# Patient Record
Sex: MALE | Race: WHITE | NOT HISPANIC OR LATINO | Employment: UNEMPLOYED | URBAN - NONMETROPOLITAN AREA
[De-identification: names, ages, dates, MRNs, and addresses within clinical notes are randomized per-mention and may not be internally consistent; named-entity substitution may affect disease eponyms.]

---

## 2023-01-16 ENCOUNTER — OFFICE VISIT (OUTPATIENT)
Dept: CARDIOLOGY | Facility: CLINIC | Age: 43
End: 2023-01-16
Payer: COMMERCIAL

## 2023-01-16 VITALS
HEIGHT: 74 IN | BODY MASS INDEX: 33.28 KG/M2 | OXYGEN SATURATION: 97 % | SYSTOLIC BLOOD PRESSURE: 120 MMHG | WEIGHT: 259.3 LBS | HEART RATE: 93 BPM | DIASTOLIC BLOOD PRESSURE: 79 MMHG

## 2023-01-16 DIAGNOSIS — R06.02 SHORTNESS OF BREATH: Primary | ICD-10-CM

## 2023-01-16 DIAGNOSIS — R53.82 CHRONIC FATIGUE: ICD-10-CM

## 2023-01-16 DIAGNOSIS — R00.2 PALPITATIONS: ICD-10-CM

## 2023-01-16 PROCEDURE — 93000 ELECTROCARDIOGRAM COMPLETE: CPT | Performed by: PHYSICIAN ASSISTANT

## 2023-01-16 PROCEDURE — 99204 OFFICE O/P NEW MOD 45 MIN: CPT | Performed by: PHYSICIAN ASSISTANT

## 2023-01-16 RX ORDER — DULOXETIN HYDROCHLORIDE 30 MG/1
CAPSULE, DELAYED RELEASE ORAL
COMMUNITY
Start: 2022-11-22

## 2023-01-16 RX ORDER — TOCILIZUMAB 180 MG/ML
INJECTION, SOLUTION SUBCUTANEOUS
COMMUNITY
Start: 2022-11-01

## 2023-01-16 RX ORDER — LEFLUNOMIDE 20 MG/1
20 TABLET ORAL DAILY
COMMUNITY
Start: 2023-01-02

## 2023-01-16 RX ORDER — CICLESONIDE 160 UG/1
1 AEROSOL, METERED RESPIRATORY (INHALATION) 2 TIMES DAILY
COMMUNITY
Start: 2023-01-11

## 2023-01-16 RX ORDER — ERGOCALCIFEROL 1.25 MG/1
CAPSULE ORAL
COMMUNITY
Start: 2022-10-10

## 2023-01-16 RX ORDER — AMITRIPTYLINE HYDROCHLORIDE 25 MG/1
25 TABLET, FILM COATED ORAL
COMMUNITY
Start: 2023-01-02

## 2023-02-23 ENCOUNTER — HOSPITAL ENCOUNTER (OUTPATIENT)
Dept: GENERAL RADIOLOGY | Facility: HOSPITAL | Age: 43
Discharge: HOME | End: 2023-02-23
Payer: COMMERCIAL

## 2023-02-23 ENCOUNTER — TRANSCRIBE ORDERS (OUTPATIENT)
Dept: LAB | Facility: HOSPITAL | Age: 43
End: 2023-02-23
Payer: COMMERCIAL

## 2023-02-23 DIAGNOSIS — M25.511 RIGHT SHOULDER PAIN, UNSPECIFIED CHRONICITY: ICD-10-CM

## 2023-02-23 DIAGNOSIS — M25.511 RIGHT SHOULDER PAIN, UNSPECIFIED CHRONICITY: Primary | ICD-10-CM

## 2023-02-23 DIAGNOSIS — M54.2 CERVICALGIA: ICD-10-CM

## 2023-02-23 PROCEDURE — 73030 X-RAY EXAM OF SHOULDER: CPT

## 2023-02-23 PROCEDURE — 73030 X-RAY EXAM OF SHOULDER: CPT | Performed by: RADIOLOGY

## 2023-02-23 PROCEDURE — 72050 X-RAY EXAM NECK SPINE 4/5VWS: CPT | Performed by: RADIOLOGY

## 2023-02-23 PROCEDURE — 72050 X-RAY EXAM NECK SPINE 4/5VWS: CPT

## 2023-03-07 ENCOUNTER — HOSPITAL ENCOUNTER (OUTPATIENT)
Dept: CARDIOLOGY | Facility: HOSPITAL | Age: 43
Discharge: HOME | End: 2023-03-07
Payer: COMMERCIAL

## 2023-03-07 DIAGNOSIS — R06.02 SHORTNESS OF BREATH: ICD-10-CM

## 2023-03-07 DIAGNOSIS — R00.2 PALPITATIONS: ICD-10-CM

## 2023-03-07 DIAGNOSIS — R53.82 CHRONIC FATIGUE: ICD-10-CM

## 2023-03-07 PROCEDURE — 93306 TTE W/DOPPLER COMPLETE: CPT

## 2023-03-07 PROCEDURE — 93306 TTE W/DOPPLER COMPLETE: CPT | Performed by: INTERNAL MEDICINE

## 2023-03-07 PROCEDURE — 93017 CV STRESS TEST TRACING ONLY: CPT

## 2023-03-07 PROCEDURE — 93018 CV STRESS TEST I&R ONLY: CPT | Performed by: INTERNAL MEDICINE

## 2023-03-08 LAB
BH CV STRESS RECOVERY BP: NORMAL MMHG
BH CV STRESS RECOVERY HR: 129 BPM
MAXIMAL PREDICTED HEART RATE: 180 BPM
PERCENT MAX PREDICTED HR: 97.78 %
STRESS BASELINE BP: NORMAL MMHG
STRESS BASELINE HR: 99 BPM
STRESS PERCENT HR: 115 %
STRESS POST ESTIMATED WORKLOAD: 10.1 METS
STRESS POST EXERCISE DUR MIN: 9 MIN
STRESS POST PEAK BP: NORMAL MMHG
STRESS POST PEAK HR: 176 BPM
STRESS TARGET HR: 153 BPM

## 2023-03-13 ENCOUNTER — TELEPHONE (OUTPATIENT)
Dept: CARDIOLOGY | Facility: CLINIC | Age: 43
End: 2023-03-13
Payer: COMMERCIAL

## 2023-03-20 LAB
BH CV ECHO MEAS - ACS: 2.05 CM
BH CV ECHO MEAS - AO MAX PG: 3.4 MMHG
BH CV ECHO MEAS - AO MEAN PG: 2.26 MMHG
BH CV ECHO MEAS - AO ROOT DIAM: 3.3 CM
BH CV ECHO MEAS - AO V2 MAX: 92.3 CM/SEC
BH CV ECHO MEAS - AO V2 VTI: 16.7 CM
BH CV ECHO MEAS - EDV(CUBED): 98 ML
BH CV ECHO MEAS - EDV(MOD-SP4): 119 ML
BH CV ECHO MEAS - EF(MOD-SP4): 51.8 %
BH CV ECHO MEAS - EF_3D-VOL: 47 %
BH CV ECHO MEAS - ESV(CUBED): 37.6 ML
BH CV ECHO MEAS - ESV(MOD-SP4): 57.4 ML
BH CV ECHO MEAS - FS: 27.3 %
BH CV ECHO MEAS - IVS/LVPW: 0.83 CM
BH CV ECHO MEAS - IVSD: 1.06 CM
BH CV ECHO MEAS - LA DIMENSION: 3.5 CM
BH CV ECHO MEAS - LAT PEAK E' VEL: 13.7 CM/SEC
BH CV ECHO MEAS - LV DIASTOLIC VOL/BSA (35-75): 49.1 CM2
BH CV ECHO MEAS - LV MASS(C)D: 198.4 GRAMS
BH CV ECHO MEAS - LV SYSTOLIC VOL/BSA (12-30): 23.7 CM2
BH CV ECHO MEAS - LVIDD: 4.6 CM
BH CV ECHO MEAS - LVIDS: 3.4 CM
BH CV ECHO MEAS - LVOT AREA: 4.6 CM2
BH CV ECHO MEAS - LVOT DIAM: 2.43 CM
BH CV ECHO MEAS - LVPWD: 1.28 CM
BH CV ECHO MEAS - MED PEAK E' VEL: 6.6 CM/SEC
BH CV ECHO MEAS - MV A MAX VEL: 77.1 CM/SEC
BH CV ECHO MEAS - MV DEC SLOPE: 219.6 CM/SEC2
BH CV ECHO MEAS - MV E MAX VEL: 47.6 CM/SEC
BH CV ECHO MEAS - MV E/A: 0.62
BH CV ECHO MEAS - RVDD: 3.3 CM
BH CV ECHO MEAS - SI(MOD-SP4): 25.4 ML/M2
BH CV ECHO MEAS - SV(MOD-SP4): 61.6 ML
BH CV ECHO MEASUREMENTS AVERAGE E/E' RATIO: 4.69
LEFT ATRIUM VOLUME INDEX: 13 ML/M2
MAXIMAL PREDICTED HEART RATE: 180 BPM
STRESS TARGET HR: 153 BPM

## 2023-03-21 ENCOUNTER — TELEPHONE (OUTPATIENT)
Dept: CARDIOLOGY | Facility: CLINIC | Age: 43
End: 2023-03-21
Payer: COMMERCIAL

## 2023-03-21 DIAGNOSIS — R06.02 SOB (SHORTNESS OF BREATH): ICD-10-CM

## 2023-03-21 DIAGNOSIS — R00.2 PALPITATIONS: ICD-10-CM

## 2023-03-21 DIAGNOSIS — R53.82 CHRONIC FATIGUE: ICD-10-CM

## 2023-03-21 DIAGNOSIS — I42.9 CARDIOMYOPATHY, UNSPECIFIED TYPE: Primary | ICD-10-CM

## 2023-03-23 ENCOUNTER — HOSPITAL ENCOUNTER (OUTPATIENT)
Dept: CARDIOLOGY | Facility: HOSPITAL | Age: 43
Discharge: HOME | End: 2023-03-23
Payer: COMMERCIAL

## 2023-03-23 DIAGNOSIS — R53.82 CHRONIC FATIGUE: ICD-10-CM

## 2023-03-23 DIAGNOSIS — R06.02 SOB (SHORTNESS OF BREATH): ICD-10-CM

## 2023-03-23 DIAGNOSIS — I42.9 CARDIOMYOPATHY, UNSPECIFIED TYPE: ICD-10-CM

## 2023-03-23 DIAGNOSIS — R00.2 PALPITATIONS: ICD-10-CM

## 2023-03-23 PROCEDURE — 93017 CV STRESS TEST TRACING ONLY: CPT

## 2023-03-23 PROCEDURE — 78452 HT MUSCLE IMAGE SPECT MULT: CPT

## 2023-03-23 PROCEDURE — 25010000002 REGADENOSON 0.4 MG/5ML SOLUTION: Performed by: INTERNAL MEDICINE

## 2023-03-23 PROCEDURE — A9500 TC99M SESTAMIBI: HCPCS | Performed by: INTERNAL MEDICINE

## 2023-03-23 PROCEDURE — 0 TECHNETIUM SESTAMIBI: Performed by: INTERNAL MEDICINE

## 2023-03-23 PROCEDURE — 93018 CV STRESS TEST I&R ONLY: CPT | Performed by: INTERNAL MEDICINE

## 2023-03-23 PROCEDURE — 78452 HT MUSCLE IMAGE SPECT MULT: CPT | Performed by: INTERNAL MEDICINE

## 2023-03-23 RX ADMIN — REGADENOSON 0.4 MG: 0.08 INJECTION, SOLUTION INTRAVENOUS at 11:21

## 2023-03-23 RX ADMIN — TECHNETIUM TC 99M SESTAMIBI 1 DOSE: 1 INJECTION INTRAVENOUS at 11:21

## 2023-03-23 RX ADMIN — TECHNETIUM TC 99M SESTAMIBI 1 DOSE: 1 INJECTION INTRAVENOUS at 08:25

## 2023-03-30 ENCOUNTER — TELEPHONE (OUTPATIENT)
Dept: CARDIOLOGY | Facility: CLINIC | Age: 43
End: 2023-03-30
Payer: COMMERCIAL

## 2023-04-03 LAB
BH CV REST NUCLEAR ISOTOPE DOSE: 10 MCI
BH CV STRESS COMMENTS STAGE 1: NORMAL
BH CV STRESS DOSE REGADENOSON STAGE 1: 0.4
BH CV STRESS DURATION MIN STAGE 1: 0
BH CV STRESS DURATION SEC STAGE 1: 10
BH CV STRESS NUCLEAR ISOTOPE DOSE: 30 MCI
BH CV STRESS PROTOCOL 1: NORMAL
BH CV STRESS RECOVERY BP: NORMAL MMHG
BH CV STRESS RECOVERY HR: 91 BPM
BH CV STRESS STAGE 1: 1
MAXIMAL PREDICTED HEART RATE: 180 BPM
PERCENT MAX PREDICTED HR: 64.44 %
STRESS BASELINE BP: NORMAL MMHG
STRESS BASELINE HR: 74 BPM
STRESS PERCENT HR: 76 %
STRESS POST PEAK BP: NORMAL MMHG
STRESS POST PEAK HR: 116 BPM
STRESS TARGET HR: 153 BPM

## 2023-04-20 ENCOUNTER — OFFICE VISIT (OUTPATIENT)
Dept: CARDIOLOGY | Facility: CLINIC | Age: 43
End: 2023-04-20
Payer: COMMERCIAL

## 2023-04-20 VITALS
HEIGHT: 74 IN | SYSTOLIC BLOOD PRESSURE: 128 MMHG | OXYGEN SATURATION: 99 % | HEART RATE: 91 BPM | WEIGHT: 254.4 LBS | BODY MASS INDEX: 32.65 KG/M2 | DIASTOLIC BLOOD PRESSURE: 82 MMHG

## 2023-04-20 DIAGNOSIS — R07.9 CHEST PAIN, UNSPECIFIED TYPE: ICD-10-CM

## 2023-04-20 DIAGNOSIS — R06.02 SOB (SHORTNESS OF BREATH): ICD-10-CM

## 2023-04-20 DIAGNOSIS — I42.9 CARDIOMYOPATHY, UNSPECIFIED TYPE: Primary | ICD-10-CM

## 2023-04-20 PROCEDURE — 99214 OFFICE O/P EST MOD 30 MIN: CPT | Performed by: PHYSICIAN ASSISTANT

## 2023-04-20 RX ORDER — ASPIRIN 81 MG/1
81 TABLET ORAL DAILY
Qty: 30 TABLET | Refills: 5 | Status: SHIPPED | OUTPATIENT
Start: 2023-04-20

## 2023-04-20 RX ORDER — BACLOFEN 10 MG/1
TABLET ORAL
COMMUNITY
Start: 2023-04-04

## 2023-04-26 ENCOUNTER — PREP FOR SURGERY (OUTPATIENT)
Dept: OTHER | Facility: HOSPITAL | Age: 43
End: 2023-04-26
Payer: COMMERCIAL

## 2023-04-26 RX ORDER — SODIUM CHLORIDE 0.9 % (FLUSH) 0.9 %
10 SYRINGE (ML) INJECTION EVERY 12 HOURS SCHEDULED
Status: CANCELLED | OUTPATIENT
Start: 2023-04-26

## 2023-04-26 RX ORDER — SODIUM CHLORIDE 9 MG/ML
40 INJECTION, SOLUTION INTRAVENOUS AS NEEDED
Status: CANCELLED | OUTPATIENT
Start: 2023-04-26

## 2023-04-26 RX ORDER — SODIUM CHLORIDE 0.9 % (FLUSH) 0.9 %
10 SYRINGE (ML) INJECTION AS NEEDED
Status: CANCELLED | OUTPATIENT
Start: 2023-04-26

## 2023-04-26 RX ORDER — ASPIRIN 81 MG/1
324 TABLET, CHEWABLE ORAL ONCE
Status: CANCELLED | OUTPATIENT
Start: 2023-04-26 | End: 2023-04-26

## 2023-04-26 RX ORDER — SODIUM CHLORIDE 9 MG/ML
3 INJECTION, SOLUTION INTRAVENOUS CONTINUOUS
Status: CANCELLED | OUTPATIENT
Start: 2023-04-26 | End: 2023-04-26

## 2023-04-26 RX ORDER — ASPIRIN 81 MG/1
81 TABLET ORAL DAILY
Status: CANCELLED | OUTPATIENT
Start: 2023-04-27

## 2023-04-27 ENCOUNTER — TELEPHONE (OUTPATIENT)
Dept: CARDIOLOGY | Facility: CLINIC | Age: 43
End: 2023-04-27

## 2023-04-27 ENCOUNTER — HOSPITAL ENCOUNTER (OUTPATIENT)
Facility: HOSPITAL | Age: 43
Setting detail: HOSPITAL OUTPATIENT SURGERY
Discharge: HOME | End: 2023-04-27
Attending: INTERNAL MEDICINE | Admitting: INTERNAL MEDICINE
Payer: COMMERCIAL

## 2023-04-27 VITALS
BODY MASS INDEX: 32.75 KG/M2 | SYSTOLIC BLOOD PRESSURE: 124 MMHG | RESPIRATION RATE: 20 BRPM | OXYGEN SATURATION: 96 % | WEIGHT: 255.2 LBS | TEMPERATURE: 97 F | DIASTOLIC BLOOD PRESSURE: 73 MMHG | HEIGHT: 74 IN | HEART RATE: 80 BPM

## 2023-04-27 DIAGNOSIS — I42.9 CARDIOMYOPATHY, UNSPECIFIED TYPE: ICD-10-CM

## 2023-04-27 DIAGNOSIS — R07.9 CHEST PAIN, UNSPECIFIED TYPE: ICD-10-CM

## 2023-04-27 DIAGNOSIS — R06.02 SOB (SHORTNESS OF BREATH): ICD-10-CM

## 2023-04-27 LAB
ANION GAP SERPL CALCULATED.3IONS-SCNC: 11 MMOL/L (ref 5–15)
BUN BLDA-MCNC: 15 MG/DL (ref 8–26)
BUN SERPL-MCNC: 15 MG/DL (ref 6–20)
BUN/CREAT SERPL: 22.1 (ref 7–25)
CA-I BLDA-SCNC: 1.18 MMOL/L (ref 1.2–1.32)
CALCIUM SPEC-SCNC: 9 MG/DL (ref 8.6–10.5)
CHLORIDE BLDA-SCNC: 106 MMOL/L (ref 98–109)
CHLORIDE SERPL-SCNC: 106 MMOL/L (ref 98–107)
CHOLEST SERPL-MCNC: 191 MG/DL (ref 0–200)
CO2 BLDA-SCNC: 21 MMOL/L (ref 24–29)
CO2 SERPL-SCNC: 21 MMOL/L (ref 22–29)
CREAT BLDA-MCNC: 0.7 MG/DL (ref 0.6–1.3)
CREAT SERPL-MCNC: 0.68 MG/DL (ref 0.76–1.27)
DEPRECATED RDW RBC AUTO: 41.2 FL (ref 37–54)
EGFRCR SERPLBLD CKD-EPI 2021: 119.5 ML/MIN/1.73
EGFRCR SERPLBLD CKD-EPI 2021: 120.5 ML/MIN/1.73
ERYTHROCYTE [DISTWIDTH] IN BLOOD BY AUTOMATED COUNT: 13.1 % (ref 12.3–15.4)
GLUCOSE BLDC GLUCOMTR-MCNC: 110 MG/DL (ref 70–130)
GLUCOSE SERPL-MCNC: 107 MG/DL (ref 65–99)
HBA1C MFR BLD: 5.5 % (ref 4.8–5.6)
HCT VFR BLD AUTO: 45.3 % (ref 37.5–51)
HCT VFR BLDA CALC: 45 % (ref 38–51)
HDLC SERPL-MCNC: 41 MG/DL (ref 40–60)
HGB BLD-MCNC: 14.7 G/DL (ref 13–17.7)
HGB BLDA-MCNC: 15.3 G/DL (ref 12–17)
LDLC SERPL CALC-MCNC: 113 MG/DL (ref 0–100)
LDLC/HDLC SERPL: 2.63 {RATIO}
MCH RBC QN AUTO: 28.3 PG (ref 26.6–33)
MCHC RBC AUTO-ENTMCNC: 32.5 G/DL (ref 31.5–35.7)
MCV RBC AUTO: 87.1 FL (ref 79–97)
PLATELET # BLD AUTO: 278 10*3/MM3 (ref 140–450)
PMV BLD AUTO: 11.3 FL (ref 6–12)
POTASSIUM BLDA-SCNC: 3.7 MMOL/L (ref 3.5–4.9)
POTASSIUM SERPL-SCNC: 3.8 MMOL/L (ref 3.5–5.2)
RBC # BLD AUTO: 5.2 10*6/MM3 (ref 4.14–5.8)
SODIUM BLD-SCNC: 141 MMOL/L (ref 138–146)
SODIUM SERPL-SCNC: 138 MMOL/L (ref 136–145)
TRIGL SERPL-MCNC: 210 MG/DL (ref 0–150)
VLDLC SERPL-MCNC: 37 MG/DL (ref 5–40)
WBC NRBC COR # BLD: 7.1 10*3/MM3 (ref 3.4–10.8)

## 2023-04-27 PROCEDURE — 25010000002 FENTANYL CITRATE (PF) 50 MCG/ML SOLUTION: Performed by: INTERNAL MEDICINE

## 2023-04-27 PROCEDURE — 93458 L HRT ARTERY/VENTRICLE ANGIO: CPT | Performed by: INTERNAL MEDICINE

## 2023-04-27 PROCEDURE — 25010000002 HEPARIN (PORCINE) PER 1000 UNITS: Performed by: INTERNAL MEDICINE

## 2023-04-27 PROCEDURE — 80047 BASIC METABLC PNL IONIZED CA: CPT

## 2023-04-27 PROCEDURE — C1769 GUIDE WIRE: HCPCS | Performed by: INTERNAL MEDICINE

## 2023-04-27 PROCEDURE — 25010000002 MIDAZOLAM PER 1 MG: Performed by: INTERNAL MEDICINE

## 2023-04-27 PROCEDURE — 85014 HEMATOCRIT: CPT

## 2023-04-27 PROCEDURE — 80061 LIPID PANEL: CPT | Performed by: PHYSICIAN ASSISTANT

## 2023-04-27 PROCEDURE — C1894 INTRO/SHEATH, NON-LASER: HCPCS | Performed by: INTERNAL MEDICINE

## 2023-04-27 PROCEDURE — 83036 HEMOGLOBIN GLYCOSYLATED A1C: CPT | Performed by: PHYSICIAN ASSISTANT

## 2023-04-27 PROCEDURE — 85027 COMPLETE CBC AUTOMATED: CPT | Performed by: PHYSICIAN ASSISTANT

## 2023-04-27 PROCEDURE — 25510000001 IOPAMIDOL PER 1 ML: Performed by: INTERNAL MEDICINE

## 2023-04-27 PROCEDURE — 80048 BASIC METABOLIC PNL TOTAL CA: CPT | Performed by: PHYSICIAN ASSISTANT

## 2023-04-27 RX ORDER — ASPIRIN 81 MG/1
324 TABLET, CHEWABLE ORAL ONCE
Status: COMPLETED | OUTPATIENT
Start: 2023-04-27 | End: 2023-04-27

## 2023-04-27 RX ORDER — SODIUM CHLORIDE 9 MG/ML
330 INJECTION, SOLUTION INTRAVENOUS CONTINUOUS
Status: ACTIVE | OUTPATIENT
Start: 2023-04-27 | End: 2023-04-27

## 2023-04-27 RX ORDER — BACLOFEN 10 MG/1
5 TABLET ORAL DAILY
COMMUNITY

## 2023-04-27 RX ORDER — LIDOCAINE HYDROCHLORIDE 10 MG/ML
INJECTION, SOLUTION EPIDURAL; INFILTRATION; INTRACAUDAL; PERINEURAL
Status: DISCONTINUED | OUTPATIENT
Start: 2023-04-27 | End: 2023-04-27 | Stop reason: HOSPADM

## 2023-04-27 RX ORDER — MIDAZOLAM HYDROCHLORIDE 1 MG/ML
INJECTION INTRAMUSCULAR; INTRAVENOUS
Status: DISCONTINUED | OUTPATIENT
Start: 2023-04-27 | End: 2023-04-27 | Stop reason: HOSPADM

## 2023-04-27 RX ORDER — SODIUM CHLORIDE 9 MG/ML
40 INJECTION, SOLUTION INTRAVENOUS AS NEEDED
Status: DISCONTINUED | OUTPATIENT
Start: 2023-04-27 | End: 2023-04-27 | Stop reason: HOSPADM

## 2023-04-27 RX ORDER — SODIUM CHLORIDE 0.9 % (FLUSH) 0.9 %
10 SYRINGE (ML) INJECTION EVERY 12 HOURS SCHEDULED
Status: DISCONTINUED | OUTPATIENT
Start: 2023-04-27 | End: 2023-04-27 | Stop reason: HOSPADM

## 2023-04-27 RX ORDER — ASPIRIN 81 MG/1
81 TABLET ORAL DAILY
Status: DISCONTINUED | OUTPATIENT
Start: 2023-04-28 | End: 2023-04-27 | Stop reason: HOSPADM

## 2023-04-27 RX ORDER — NICARDIPINE HCL-0.9% SOD CHLOR 1 MG/10 ML
SYRINGE (ML) INTRAVENOUS
Status: DISCONTINUED | OUTPATIENT
Start: 2023-04-27 | End: 2023-04-27 | Stop reason: HOSPADM

## 2023-04-27 RX ORDER — HEPARIN SODIUM 1000 [USP'U]/ML
INJECTION, SOLUTION INTRAVENOUS; SUBCUTANEOUS
Status: DISCONTINUED | OUTPATIENT
Start: 2023-04-27 | End: 2023-04-27 | Stop reason: HOSPADM

## 2023-04-27 RX ORDER — SODIUM CHLORIDE 0.9 % (FLUSH) 0.9 %
10 SYRINGE (ML) INJECTION AS NEEDED
Status: DISCONTINUED | OUTPATIENT
Start: 2023-04-27 | End: 2023-04-27 | Stop reason: HOSPADM

## 2023-04-27 RX ORDER — NAPROXEN SODIUM 220 MG
220 TABLET ORAL DAILY
COMMUNITY

## 2023-04-27 RX ORDER — ACETAMINOPHEN 325 MG/1
650 TABLET ORAL EVERY 4 HOURS PRN
Status: DISCONTINUED | OUTPATIENT
Start: 2023-04-27 | End: 2023-04-27 | Stop reason: HOSPADM

## 2023-04-27 RX ORDER — FENTANYL CITRATE 50 UG/ML
INJECTION, SOLUTION INTRAMUSCULAR; INTRAVENOUS
Status: DISCONTINUED | OUTPATIENT
Start: 2023-04-27 | End: 2023-04-27 | Stop reason: HOSPADM

## 2023-04-27 RX ADMIN — SODIUM CHLORIDE 330 ML/HR: 9 INJECTION, SOLUTION INTRAVENOUS at 06:56

## 2023-04-27 RX ADMIN — ASPIRIN 81 MG 324 MG: 81 TABLET ORAL at 06:59

## 2023-08-16 ENCOUNTER — OFFICE VISIT (OUTPATIENT)
Dept: CARDIOLOGY | Facility: CLINIC | Age: 43
End: 2023-08-16
Payer: COMMERCIAL

## 2023-08-16 VITALS
WEIGHT: 264 LBS | HEART RATE: 90 BPM | DIASTOLIC BLOOD PRESSURE: 76 MMHG | HEIGHT: 74 IN | OXYGEN SATURATION: 95 % | SYSTOLIC BLOOD PRESSURE: 125 MMHG | BODY MASS INDEX: 33.88 KG/M2

## 2023-08-16 DIAGNOSIS — I42.9 CARDIOMYOPATHY, UNSPECIFIED TYPE: Primary | ICD-10-CM

## 2023-08-16 DIAGNOSIS — R00.2 PALPITATIONS: ICD-10-CM

## 2023-08-16 DIAGNOSIS — R06.02 SOB (SHORTNESS OF BREATH): ICD-10-CM

## 2023-08-16 DIAGNOSIS — R07.9 CHEST PAIN, UNSPECIFIED TYPE: ICD-10-CM

## 2023-08-16 PROCEDURE — 99214 OFFICE O/P EST MOD 30 MIN: CPT | Performed by: PHYSICIAN ASSISTANT

## 2023-08-16 PROCEDURE — 93000 ELECTROCARDIOGRAM COMPLETE: CPT | Performed by: PHYSICIAN ASSISTANT

## 2023-08-16 RX ORDER — METOPROLOL SUCCINATE 25 MG/1
25 TABLET, EXTENDED RELEASE ORAL DAILY
Qty: 30 TABLET | Refills: 11 | Status: SHIPPED | OUTPATIENT
Start: 2023-08-16

## 2023-08-28 ENCOUNTER — TELEPHONE (OUTPATIENT)
Dept: CARDIOLOGY | Facility: CLINIC | Age: 43
End: 2023-08-28
Payer: COMMERCIAL

## 2023-08-29 RX ORDER — NEBIVOLOL 2.5 MG/1
2.5 TABLET ORAL DAILY
Qty: 30 TABLET | Refills: 11 | Status: SHIPPED | OUTPATIENT
Start: 2023-08-29

## 2023-09-01 ENCOUNTER — TELEPHONE (OUTPATIENT)
Dept: CARDIOLOGY | Facility: CLINIC | Age: 43
End: 2023-09-01
Payer: COMMERCIAL

## 2023-09-12 RX ORDER — BISOPROLOL FUMARATE 5 MG/1
5 TABLET, FILM COATED ORAL DAILY
Qty: 30 TABLET | Refills: 11 | Status: SHIPPED | OUTPATIENT
Start: 2023-09-12

## 2023-10-12 ENCOUNTER — TRANSCRIBE ORDERS (OUTPATIENT)
Dept: ADMINISTRATIVE | Facility: HOSPITAL | Age: 43
End: 2023-10-12
Payer: COMMERCIAL

## 2023-10-12 DIAGNOSIS — R91.8 LUNG FIELD ABNORMAL FINDING ON EXAMINATION: Primary | ICD-10-CM

## 2023-10-17 ENCOUNTER — TELEPHONE (OUTPATIENT)
Dept: CARDIOLOGY | Facility: CLINIC | Age: 43
End: 2023-10-17
Payer: COMMERCIAL

## 2023-10-18 ENCOUNTER — TELEPHONE (OUTPATIENT)
Dept: CARDIOLOGY | Facility: CLINIC | Age: 43
End: 2023-10-18
Payer: COMMERCIAL

## 2023-10-27 ENCOUNTER — HOSPITAL ENCOUNTER (OUTPATIENT)
Dept: CT IMAGING | Facility: HOSPITAL | Age: 43
Discharge: HOME | End: 2023-10-27
Admitting: SPECIALIST
Payer: COMMERCIAL

## 2023-10-27 DIAGNOSIS — R91.8 LUNG FIELD ABNORMAL FINDING ON EXAMINATION: ICD-10-CM

## 2023-10-27 PROCEDURE — 71250 CT THORAX DX C-: CPT | Performed by: RADIOLOGY

## 2023-10-27 PROCEDURE — 71250 CT THORAX DX C-: CPT

## 2023-11-20 ENCOUNTER — TELEPHONE (OUTPATIENT)
Dept: CARDIOLOGY | Facility: CLINIC | Age: 43
End: 2023-11-20
Payer: COMMERCIAL

## 2024-08-12 ENCOUNTER — TELEPHONE (OUTPATIENT)
Dept: CARDIOLOGY | Facility: CLINIC | Age: 44
End: 2024-08-12

## 2024-09-10 RX ORDER — BISOPROLOL FUMARATE 5 MG/1
5 TABLET, FILM COATED ORAL DAILY
Qty: 90 TABLET | Refills: 3 | Status: SHIPPED | OUTPATIENT
Start: 2024-09-10

## 2024-10-28 ENCOUNTER — TELEPHONE (OUTPATIENT)
Dept: CARDIOLOGY | Facility: CLINIC | Age: 44
End: 2024-10-28

## 2024-10-28 NOTE — TELEPHONE ENCOUNTER
Caller: DAVID BLANDON WITH STATE [facility]    Relationship: Other    Best call back number: [phone]     What is the best time to reach you: ANY     What was the call regarding: A MEDICAL RECORD REQUEST WAS SENT OVER ON 10/22, WANTS TO MAKE SURE THIS HAS BEEN RECEIVED- PLEASE ADVISE.     CASE NUMBER: [id number]    Is it okay if the provider responds through MyChart: CALL

## 2024-11-25 ENCOUNTER — TELEPHONE (OUTPATIENT)
Dept: CARDIOLOGY | Facility: CLINIC | Age: 44
End: 2024-11-25

## 2024-11-25 NOTE — TELEPHONE ENCOUNTER
Caller: ERA ARNETT    Relationship: Emergency Contact    Best call back number: [phone]    Which medication are you concerned about: ENTRESTO  MG  2 TIMES DAILY    Who prescribed you this medication: THOMAS FELDMAN    When did you start taking this medication: HIGHER DOSE SINCE 4-11-24    What are your concerns: PATIENTS WIFE, ERA ARNETT, IS CALLING BECAUSE THEY ARE CHANGING INSURANCE BEGINNING OF THE YEAR AND THEY WILL NOT COVER THE ENTRESTO. PATIENTS WIFE, ERA ARNETT WOULD LIKE TO KNOW IF THERE IS A DIFFERENT MEDICATION PATIENT CAN BE PUT ON. PATIENTS WIFE STATES  HE HAS NOT HAD TESTING DONE TO CHECK ON PATIENTS STATUS. PATIENT HAS APPOINTMENT IS NOT UNTIL  2-19-25. DOES PATIENT NEED TO COME IN BEFORE THAT. PLEASE REACH OUT.  THEY WOULD LIKE TO TALK ABOUT STRESS TEST BEING SCHEDULED.    How long have you had these concerns:

## 2025-01-28 ENCOUNTER — TELEPHONE (OUTPATIENT)
Dept: CARDIOLOGY | Facility: CLINIC | Age: 45
End: 2025-01-28
Payer: COMMERCIAL

## 2025-01-28 NOTE — TELEPHONE ENCOUNTER
Patients wife called triage to inform that entresto is not affordable. They want to know if there is another medication that can be tried to.

## 2025-01-29 RX ORDER — LOSARTAN POTASSIUM 25 MG/1
25 TABLET ORAL DAILY
Qty: 30 TABLET | Refills: 5 | Status: SHIPPED | OUTPATIENT
Start: 2025-01-29

## 2025-02-26 ENCOUNTER — OFFICE VISIT (OUTPATIENT)
Dept: CARDIOLOGY | Facility: CLINIC | Age: 45
End: 2025-02-26
Payer: COMMERCIAL

## 2025-02-26 VITALS
WEIGHT: 253 LBS | SYSTOLIC BLOOD PRESSURE: 126 MMHG | BODY MASS INDEX: 32.47 KG/M2 | HEIGHT: 74 IN | HEART RATE: 71 BPM | DIASTOLIC BLOOD PRESSURE: 78 MMHG

## 2025-02-26 DIAGNOSIS — I42.9 CARDIOMYOPATHY, UNSPECIFIED TYPE: Primary | ICD-10-CM

## 2025-02-26 DIAGNOSIS — R06.02 SOB (SHORTNESS OF BREATH): ICD-10-CM

## 2025-02-26 PROCEDURE — 99213 OFFICE O/P EST LOW 20 MIN: CPT | Performed by: PHYSICIAN ASSISTANT

## 2025-02-26 NOTE — PROGRESS NOTES
Problem list     Subjective   Woody Romero is a 44 y.o. male     Chief Complaint   Patient presents with    Follow-up     Cardiomyopathy     Problem list  1.  Chest pain  1.1 nuclear stress test April 2023 with no evidence of ischemia but post-rest EF of 46% with anterior wall motion abnormalities  2.  Dilated cardiomyopathy  2.1 EF estimated 40 to 45% by echocardiogram and 46% by stress test  2.2 EF estimated at 54% by cardiac MRI June 2023  2.3 right heart catheterization and biopsy June 2024 at [facility] with normal left and right-sided filling pressures.    3.  Normal coronaries by catheterization April 2023     HPI    Patient is a 44-year-old male presenting to the office for routine cardiac assessment.  Clinically, he has done well.  He has history of dilated cardiomyopathy that appears to be mild and was seen through [facility].  He underwent extensive testing to include cardiac MRI and then eventually right heart catheterization with biopsy with no significant abnormality noted.  Recommendations were for patient to continue medical therapy.    He does not complain of any chest pain or pressure.  He has a mild amount of dyspnea but does not describe any progression or worsening of his overall dyspnea.  He does not describe PND or orthopnea.  No complaints of edema.    No palpitations or dysrhythmic symptoms.  He is stable otherwise.    Current Outpatient Medications on File Prior to Visit   Medication Sig Dispense Refill    amitriptyline (ELAVIL) 25 MG tablet Take 1 tablet by mouth every night at bedtime.      baclofen (LIORESAL) 5 MG half tablet Take 1 half tablet by mouth Daily.      bisoprolol (ZEBeta) 5 MG tablet TAKE 1 TABLET BY MOUTH EVERY DAY 90 tablet 3    losartan (Cozaar) 25 MG tablet Take 1 tablet by mouth Daily. 30 tablet 5    naproxen sodium (ALEVE) 220 MG tablet Take 1 tablet by mouth Daily.       No current facility-administered medications on file prior to  "visit.       Patient has no known allergies.    Past Medical History:   Diagnosis Date    Carpal tunnel syndrome     Fibromyalgia     Rheumatoid arthritis        Social History     Socioeconomic History    Marital status:    Tobacco Use    Smoking status: Never    Smokeless tobacco: Never   Vaping Use    Vaping status: Never Used   Substance and Sexual Activity    Alcohol use: Never    Drug use: Never    Sexual activity: Defer       Family History   Problem Relation Age of Onset    Heart disease Father     Hypertension Brother        Review of Systems   Constitutional:  Negative for activity change, appetite change, chills, fatigue and fever.   HENT: Negative.  Negative for congestion, sinus pressure and sinus pain.    Eyes: Negative.  Negative for visual disturbance.   Respiratory:  Positive for shortness of breath. Negative for apnea, cough, chest tightness and wheezing.    Cardiovascular: Negative.  Negative for chest pain, palpitations and leg swelling.   Gastrointestinal: Negative.  Negative for blood in stool.   Endocrine: Negative.  Negative for cold intolerance and heat intolerance.   Genitourinary: Negative.  Negative for hematuria.   Musculoskeletal: Negative.  Negative for gait problem.   Skin: Negative.  Negative for color change, rash and wound.   Allergic/Immunologic: Negative.  Negative for environmental allergies and food allergies.   Neurological: Negative.  Negative for dizziness, syncope, weakness, light-headedness, numbness and headaches.   Hematological: Negative.  Does not bruise/bleed easily.   Psychiatric/Behavioral: Negative.  Negative for sleep disturbance.        Objective   Vitals:    02/26/25 1423   BP: 126/78   BP Location: Right arm   Patient Position: Sitting   Cuff Size: Adult   Pulse: 71   Weight: 115 kg (253 lb)   Height: 188 cm (74\")      /78 (BP Location: Right arm, Patient Position: Sitting, Cuff Size: Adult)   Pulse 71   Ht 188 cm (74\")   Wt 115 kg (253 lb)   " BMI 32.48 kg/m²     Lab Results (most recent)       None            Physical Exam  Vitals and nursing note reviewed.   Constitutional:       General: He is not in acute distress.     Appearance: Normal appearance. He is well-developed.   HENT:      Head: Normocephalic and atraumatic.   Eyes:      General: No scleral icterus.        Right eye: No discharge.         Left eye: No discharge.      Conjunctiva/sclera: Conjunctivae normal.   Neck:      Vascular: No carotid bruit.   Cardiovascular:      Rate and Rhythm: Normal rate and regular rhythm.      Heart sounds: Normal heart sounds. No murmur heard.     No friction rub. No gallop.   Pulmonary:      Effort: Pulmonary effort is normal. No respiratory distress.      Breath sounds: Normal breath sounds. No wheezing or rales.   Chest:      Chest wall: No tenderness.   Musculoskeletal:      Right lower leg: No edema.      Left lower leg: No edema.   Skin:     General: Skin is warm and dry.      Coloration: Skin is not pale.      Findings: No erythema or rash.   Neurological:      Mental Status: He is alert and oriented to person, place, and time.      Cranial Nerves: No cranial nerve deficit.   Psychiatric:         Behavior: Behavior normal.         Procedure   Procedures       Assessment & Plan     Problems Addressed this Visit          Cardiac and Vasculature    Cardiomyopathy - Primary       Pulmonary and Pneumonias    SOB (shortness of breath)     Diagnoses         Codes Comments    Cardiomyopathy, unspecified type    -  Primary ICD-10-CM: I42.9  ICD-9-CM: 425.4     SOB (shortness of breath)     ICD-10-CM: R06.02  ICD-9-CM: 786.05           Recommendations  1.  Patient is a 44-year-old male presenting back to the office for assessment.  He feels well.  He does not complain of any chest pain or pressure.  Dyspnea is stable.  For now, we can monitor.  He is on appropriate medical therapy.  He had previously been on Entresto and other medications.  Entresto was  significantly expensive.  He currently is on bisoprolol and losartan.  We will continue for now.  Last evaluation of systolic function showed normalization or improvement of EF.    2.  Patient with stable dyspnea.  We can monitor for now.  We can consider repeat echocardiogram upon follow-up.  For now, he feels stable.  We can see him back for follow-up in 6 months to year or sooner if needed.  Follow-up with primary as scheduled.           Patient did not bring med list or medicine bottles to appointment, med list has been reviewed and updated based on patient's knowledge of their meds.      Electronically signed by:     CHAU ARZOLA ; 12/18/2020 ; [facility]  CHAU ARZOLA ; 12/23/2020 ; [facility]  CHAU ARZOLA ; 12/24/2020 ; [facility]  CHAU ARZOLA ; 01/06/2021 ; [facility]  CHAU ARZOLA ; 01/07/2021 ; [facility]  CHAU ARZOLA ; 01/21/2021 ; [facility]  CHAU ARZOLA ; 01/25/2021 ; [facility]

## 2025-02-26 NOTE — LETTER
February 27, 2025     HELEN Mcgee  [address] [facility] [address] [place] [address]    Patient: Woody Romero   YOB: 1980   Date of Visit: 2/26/2025       Dear HELEN Mcgee    Woody Romero was in my office today. Below is a copy of my note.    If you have questions, please do not hesitate to call me. I look forward to following Woody along with you.         Sincerely,        THOMAS Mitchell        CC: No Recipients    Problem list     Subjective  Woody Romero is a 44 y.o. male     Chief Complaint   Patient presents with   • Follow-up     Cardiomyopathy     Problem list  1.  Chest pain  1.1 nuclear stress test April 2023 with no evidence of ischemia but post-rest EF of 46% with anterior wall motion abnormalities  2.  Dilated cardiomyopathy  2.1 EF estimated 40 to 45% by echocardiogram and 46% by stress test  2.2 EF estimated at 54% by cardiac MRI June 2023  2.3 right heart catheterization and biopsy June 2024 at [facility] with normal left and right-sided filling pressures.    3.  Normal coronaries by catheterization April 2023     HPI    Patient is a 44-year-old male presenting to the office for routine cardiac assessment.  Clinically, he has done well.  He has history of dilated cardiomyopathy that appears to be mild and was seen through [facility].  He underwent extensive testing to include cardiac MRI and then eventually right heart catheterization with biopsy with no significant abnormality noted.  Recommendations were for patient to continue medical therapy.    He does not complain of any chest pain or pressure.  He has a mild amount of dyspnea but does not describe any progression or worsening of his overall dyspnea.  He does not describe PND or orthopnea.  No complaints of edema.    No palpitations or dysrhythmic symptoms.  He is stable otherwise.    Current Outpatient Medications on File Prior to Visit   Medication Sig Dispense Refill   •  amitriptyline (ELAVIL) 25 MG tablet Take 1 tablet by mouth every night at bedtime.     • baclofen (LIORESAL) 5 MG half tablet Take 1 half tablet by mouth Daily.     • bisoprolol (ZEBeta) 5 MG tablet TAKE 1 TABLET BY MOUTH EVERY DAY 90 tablet 3   • losartan (Cozaar) 25 MG tablet Take 1 tablet by mouth Daily. 30 tablet 5   • naproxen sodium (ALEVE) 220 MG tablet Take 1 tablet by mouth Daily.       No current facility-administered medications on file prior to visit.       Patient has no known allergies.    Past Medical History:   Diagnosis Date   • Carpal tunnel syndrome    • Fibromyalgia    • Rheumatoid arthritis        Social History     Socioeconomic History   • Marital status:    Tobacco Use   • Smoking status: Never   • Smokeless tobacco: Never   Vaping Use   • Vaping status: Never Used   Substance and Sexual Activity   • Alcohol use: Never   • Drug use: Never   • Sexual activity: Defer       Family History   Problem Relation Age of Onset   • Heart disease Father    • Hypertension Brother        Review of Systems   Constitutional:  Negative for activity change, appetite change, chills, fatigue and fever.   HENT: Negative.  Negative for congestion, sinus pressure and sinus pain.    Eyes: Negative.  Negative for visual disturbance.   Respiratory:  Positive for shortness of breath. Negative for apnea, cough, chest tightness and wheezing.    Cardiovascular: Negative.  Negative for chest pain, palpitations and leg swelling.   Gastrointestinal: Negative.  Negative for blood in stool.   Endocrine: Negative.  Negative for cold intolerance and heat intolerance.   Genitourinary: Negative.  Negative for hematuria.   Musculoskeletal: Negative.  Negative for gait problem.   Skin: Negative.  Negative for color change, rash and wound.   Allergic/Immunologic: Negative.  Negative for environmental allergies and food allergies.   Neurological: Negative.  Negative for dizziness, syncope, weakness, light-headedness, numbness  "and headaches.   Hematological: Negative.  Does not bruise/bleed easily.   Psychiatric/Behavioral: Negative.  Negative for sleep disturbance.        Objective  Vitals:    02/26/25 1423   BP: 126/78   BP Location: Right arm   Patient Position: Sitting   Cuff Size: Adult   Pulse: 71   Weight: 115 kg (253 lb)   Height: 188 cm (74\")      /78 (BP Location: Right arm, Patient Position: Sitting, Cuff Size: Adult)   Pulse 71   Ht 188 cm (74\")   Wt 115 kg (253 lb)   BMI 32.48 kg/m²     Lab Results (most recent)       None            Physical Exam  Vitals and nursing note reviewed.   Constitutional:       General: He is not in acute distress.     Appearance: Normal appearance. He is well-developed.   HENT:      Head: Normocephalic and atraumatic.   Eyes:      General: No scleral icterus.        Right eye: No discharge.         Left eye: No discharge.      Conjunctiva/sclera: Conjunctivae normal.   Neck:      Vascular: No carotid bruit.   Cardiovascular:      Rate and Rhythm: Normal rate and regular rhythm.      Heart sounds: Normal heart sounds. No murmur heard.     No friction rub. No gallop.   Pulmonary:      Effort: Pulmonary effort is normal. No respiratory distress.      Breath sounds: Normal breath sounds. No wheezing or rales.   Chest:      Chest wall: No tenderness.   Musculoskeletal:      Right lower leg: No edema.      Left lower leg: No edema.   Skin:     General: Skin is warm and dry.      Coloration: Skin is not pale.      Findings: No erythema or rash.   Neurological:      Mental Status: He is alert and oriented to person, place, and time.      Cranial Nerves: No cranial nerve deficit.   Psychiatric:         Behavior: Behavior normal.         Procedure  Procedures       Assessment & Plan    Problems Addressed this Visit          Cardiac and Vasculature    Cardiomyopathy - Primary       Pulmonary and Pneumonias    SOB (shortness of breath)     Diagnoses         Codes Comments    Cardiomyopathy, " unspecified type    -  Primary ICD-10-CM: I42.9  ICD-9-CM: 425.4     SOB (shortness of breath)     ICD-10-CM: R06.02  ICD-9-CM: 786.05           Recommendations  1.  Patient is a 44-year-old male presenting back to the office for assessment.  He feels well.  He does not complain of any chest pain or pressure.  Dyspnea is stable.  For now, we can monitor.  He is on appropriate medical therapy.  He had previously been on Entresto and other medications.  Entresto was significantly expensive.  He currently is on bisoprolol and losartan.  We will continue for now.  Last evaluation of systolic function showed normalization or improvement of EF.    2.  Patient with stable dyspnea.  We can monitor for now.  We can consider repeat echocardiogram upon follow-up.  For now, he feels stable.  We can see him back for follow-up in 6 months to year or sooner if needed.  Follow-up with primary as scheduled.           Patient did not bring med list or medicine bottles to appointment, med list has been reviewed and updated based on patient's knowledge of their meds.      Electronically signed by:

## 2025-03-18 ENCOUNTER — TRANSCRIBE ORDERS (OUTPATIENT)
Dept: ADMINISTRATIVE | Facility: HOSPITAL | Age: 45
End: 2025-03-18
Payer: COMMERCIAL

## 2025-03-18 DIAGNOSIS — M54.12 CERVICAL RADICULOPATHY: Primary | ICD-10-CM

## 2025-03-27 ENCOUNTER — HOSPITAL ENCOUNTER (OUTPATIENT)
Dept: MRI IMAGING | Facility: HOSPITAL | Age: 45
Discharge: HOME | End: 2025-03-27
Admitting: PHYSICIAN ASSISTANT
Payer: COMMERCIAL

## 2025-03-27 DIAGNOSIS — M54.12 CERVICAL RADICULOPATHY: ICD-10-CM

## 2025-03-27 PROCEDURE — 72141 MRI NECK SPINE W/O DYE: CPT

## 2025-04-14 ENCOUNTER — OFFICE VISIT (OUTPATIENT)
Dept: NEUROSURGERY | Facility: CLINIC | Age: 45
End: 2025-04-14
Payer: COMMERCIAL

## 2025-04-14 VITALS — TEMPERATURE: 97.1 F | BODY MASS INDEX: 31.43 KG/M2 | WEIGHT: 244.9 LBS | HEIGHT: 74 IN

## 2025-04-14 DIAGNOSIS — M54.2 NECK PAIN: ICD-10-CM

## 2025-04-14 DIAGNOSIS — G24.9 DYSTONIA: Primary | ICD-10-CM

## 2025-04-14 PROCEDURE — 99213 OFFICE O/P EST LOW 20 MIN: CPT

## 2025-04-14 RX ORDER — ROPINIROLE 2 MG/1
2 TABLET, FILM COATED ORAL NIGHTLY
COMMUNITY
Start: 2025-02-09

## 2025-04-14 RX ORDER — BECLOMETHASONE DIPROPIONATE HFA 80 UG/1
2 AEROSOL, METERED RESPIRATORY (INHALATION)
COMMUNITY
Start: 2025-03-16

## 2025-04-14 RX ORDER — METHOCARBAMOL 750 MG/1
750 TABLET, FILM COATED ORAL 3 TIMES DAILY
COMMUNITY
Start: 2025-03-16

## 2025-04-14 RX ORDER — UPADACITINIB 15 MG/1
TABLET, EXTENDED RELEASE ORAL
COMMUNITY
Start: 2024-04-01

## 2025-04-14 RX ORDER — HYDROCODONE BITARTRATE AND ACETAMINOPHEN 7.5; 325 MG/1; MG/1
1 TABLET ORAL EVERY 4 HOURS PRN
COMMUNITY

## 2025-04-14 RX ORDER — TRIAMCINOLONE ACETONIDE 1 MG/G
1 CREAM TOPICAL 2 TIMES DAILY
COMMUNITY

## 2025-04-14 NOTE — PROGRESS NOTES
Name: Woody Romero    : 1980     MRN: [id number]     Primary Care Provider: Danyelle Orantes APRN    Chief Complaint  Neck Pain      History of Present Illness:  Woody Romero is a 44 y.o. male with a history of RA who presents today for evaluation of neck pain and stiffness. He has been seen in the past by  neurology for workup of distal dystonia and underwent ultimately a workup for Parkinsons that was ultimately benign. He has been trying to get into a movement disorder specialist for a second opinion with the [facility]. Of note, he has a known his of a left upper extremity tremor and since starting Ropinirole, he has improved significantly. During our visit today, he presents with an MRI of the cervical spine for my review. He denies any radicular symptoms. He is a patient at [facility] (Dr. Ritter) and recently underwent TRAY on 25 with minimal relief. He was ultimately referred to our clinic for further workup. During our visit today, he reports mainly neck pain and stiffness. He denies lower extremity weakness, falls, or  weakness. He is mainly concerned about finding out why he is experiencing contractures in his left hand and tremors.     The following portions of the patient's history were reviewed and updated as appropriate and include current medications, allergies, allergies, past family history, past medical history, past social history, past surgical history and problem list.     PMHX  Allergies:  No Known Allergies  Medications    Current Outpatient Medications:     bisoprolol (ZEBeta) 5 MG tablet, TAKE 1 TABLET BY MOUTH EVERY DAY, Disp: 90 tablet, Rfl: 3    HYDROcodone-acetaminophen (NORCO) 7.5-325 MG per tablet, Take 1 tablet by mouth Every 4 (Four) Hours As Needed., Disp: , Rfl:     losartan (Cozaar) 25 MG tablet, Take 1 tablet by mouth Daily., Disp: 30 tablet, Rfl: 5    methocarbamol (ROBAXIN) 750 MG tablet, Take 1 tablet by mouth 3 (Three) Times a  Day., Disp: , Rfl:     naproxen sodium (ALEVE) 220 MG tablet, Take 1 tablet by mouth Daily., Disp: , Rfl:     Qvar RediHaler 80 MCG/ACT inhaler, Inhale 2 puffs 2 (Two) Times a Day., Disp: , Rfl:     Rinvoq 15 MG tablet sustained-release 24 hour, , Disp: , Rfl:     rOPINIRole (REQUIP) 2 MG tablet, Take 1 tablet by mouth Every Night., Disp: , Rfl:     triamcinolone (KENALOG) 0.1 % cream, Apply 1 Application topically to the appropriate area as directed 2 (Two) Times a Day., Disp: , Rfl:   Past Medical History:  Past Medical History:   Diagnosis Date    Arthritis of neck     Asthma July 2022    Carpal tunnel syndrome     Fibromyalgia     Headache     Knee swelling     Low back pain     Rheumatoid arthritis      Past Surgical History:  Past Surgical History:   Procedure Laterality Date    CARDIAC CATHETERIZATION Left 04/27/2023    Procedure: Coronary angiography;  Surgeon: Alcides Barry MD;  Location: [facility] INVASIVE LOCATION;  Service: Cardiovascular;  Laterality: Left;    EPIDURAL BLOCK  1/29/25    Did not help pain    TRIGGER POINT INJECTION       Social Hx:  Social History     Tobacco Use    Smoking status: Never     Passive exposure: Never    Smokeless tobacco: Never   Vaping Use    Vaping status: Never Used   Substance Use Topics    Alcohol use: Never    Drug use: Never     Family Hx:  Family History   Problem Relation Age of Onset    Heart disease Father     Heart attack Father     Heart failure Father     Hypertension Brother     Heart failure Maternal Grandmother     Heart attack Paternal Grandfather     Heart disease Paternal Grandfather     Heart attack Paternal Grandmother     Heart disease Paternal Grandmother     Hypertension Paternal Grandmother      Review of Systems:        Review of Systems   Constitutional:  Negative for activity change, appetite change, chills, diaphoresis, fatigue, fever and unexpected weight change.   HENT:  Negative for congestion, dental problem, drooling, ear discharge,  "ear pain, facial swelling, hearing loss, mouth sores, nosebleeds, postnasal drip, rhinorrhea, sinus pressure, sinus pain, sneezing, sore throat, tinnitus, trouble swallowing and voice change.    Eyes:  Negative for photophobia, pain, discharge, redness, itching and visual disturbance.   Respiratory:  Negative for apnea, cough, choking, chest tightness, shortness of breath, wheezing and stridor.    Cardiovascular:  Negative for chest pain, palpitations and leg swelling.   Gastrointestinal:  Negative for abdominal distention, abdominal pain, anal bleeding, blood in stool, constipation, diarrhea, nausea, rectal pain and vomiting.   Endocrine: Negative for cold intolerance, heat intolerance, polydipsia, polyphagia and polyuria.   Genitourinary:  Negative for decreased urine volume, difficulty urinating, dysuria, enuresis, flank pain, frequency, genital sores, hematuria, penile discharge, penile pain, penile swelling, scrotal swelling, testicular pain and urgency.   Musculoskeletal:  Positive for neck pain and neck stiffness. Negative for arthralgias, back pain, gait problem, joint swelling and myalgias.   Skin:  Negative for color change, pallor, rash and wound.   Allergic/Immunologic: Negative for environmental allergies, food allergies and immunocompromised state.   Neurological:  Negative for dizziness, tremors, seizures, syncope, facial asymmetry, speech difficulty, weakness, light-headedness, numbness and headaches.   Hematological:  Negative for adenopathy. Does not bruise/bleed easily.   Psychiatric/Behavioral:  Negative for agitation, behavioral problems, confusion, decreased concentration, dysphoric mood, hallucinations, self-injury, sleep disturbance and suicidal ideas. The patient is not nervous/anxious and is not hyperactive.       The remaining review of systems is negative as otherwise stated in the HPI.    Vital Signs: Temp 97.1 °F (36.2 °C) (Infrared)   Ht 188 cm (74\")   Wt 111 kg (244 lb 14.4 oz)   " BMI 31.44 kg/m²        Physical Exam  Vitals and nursing note reviewed.   Constitutional:       General: He is not in acute distress.     Appearance: Normal appearance. He is not ill-appearing, toxic-appearing or diaphoretic.   HENT:      Head: Normocephalic and atraumatic.   Cardiovascular:      Comments: +3 radial pulses bilaterally.  Musculoskeletal:      Comments: 5 out of 5 strength with shoulder abduction, elbow extension and flexion bilaterally.  5 out of 5 strength in intrinsic hand muscles bilaterally.   Neurological:      Mental Status: He is alert.      Cranial Nerves: No cranial nerve deficit or facial asymmetry.      Sensory: Sensation is intact. No sensory deficit.      Motor: Motor function is intact. No weakness, tremor, atrophy or abnormal muscle tone.      Coordination: Romberg sign negative. Finger-Nose-Finger Test normal.      Gait: Gait is intact. Gait and tandem walk normal.      Deep Tendon Reflexes: Reflexes abnormal.      Reflex Scores:       Tricep reflexes are 1+ on the right side and 1+ on the left side.       Bicep reflexes are 1+ on the right side and 1+ on the left side.       Brachioradialis reflexes are 1+ on the right side and 1+ on the left side.       Patellar reflexes are 3+ on the right side and 3+ on the left side.       Achilles reflexes are 3+ on the right side and 3+ on the left side.     Comments: Greta negative bilaterally.  Intact vibratory and temperature sensation bilaterally.  No clonus.   Mild irregular tremor of the left hand.    Psychiatric:         Mood and Affect: Mood normal.         Behavior: Behavior normal.         Thought Content: Thought content normal.         Judgment: Judgment normal.            Social History    Tobacco Use      Smoking status: Never        Passive exposure: Never      Smokeless tobacco: Never       Tobacco Use: Low Risk  (4/14/2025)    Patient History     Smoking Tobacco Use: Never     Smokeless Tobacco Use: Never     Passive  Exposure: Never           STEADI Fall Risk Assessment has not been completed.      Diagnostic Studies: (All imaging is independently reviewed unless stated otherwise.)  Today I have for my review an MRI of the cervical spine without contrast that was completed on 3/27/25. There is loss of the normal cervical lordosis. There is no obvious evidence of cervical nerve root compromise or central canal stenosis. No evidence of cord compromise. There are no acute findings or evidence of central canal or cervical nerve root impingement.     I also have an EMG of the bilateral upper extremities that was completed on 6/25/25 of the left upper and lower extremities that was normal.       Assessment/Plan    Diagnoses and all orders for this visit:    1. Dystonia (Primary)  -     Ambulatory Referral to Neurology    2. Neck pain         This is a 44 year old male with a history of RA that presents to our services with ongoing neck pain and stiffness. He has been evaluated by  Neurology for his several year history of stiffness with an ultimately benign workup. My examination did reveal some abnormalities in his reflexes, brisk in the lowers and reduced in the uppers. He has been trying to get into the [facility] for a second opinion and further workup but has been lost to follow up with her Neurologist at . He presents to our services today with an MRI of the cervical spine for my review and EMG. I have carefully reviewed his cervical MRI and there is no obvious evidence of underlying cervical pathology based on these images contributing to his symptoms. I would like for him to get reestablished with a neurologist, as he would like to get a second opinion. I have placed a new referral to neurology for him today. In regard to his neck pain and stiffness, this is most likely musculoskeletal in nature as I do not appreciate any underlying findings on his cervical MRI or EMG contributing to his symptoms. There is currently  no role for neurosurgical intervention. I had a lengthy and protracted conversation with the patient and his wife today reviewing his images.   Patient encouraged to contact us if there are any changes in his condition or any concerns, specifically those associated with cervical nerve root impingement and myelopathy. Signs and symptoms that would require further urgent/emergent workup or for patient to reach out to our office were reviewed. Patient can follow up on an as needed basis moving forward as I would be happy to see him back.     Any copied data from previous notes included in the (1) HPI, (2) PE, (3) MDM and/or Assessment and Plan has been reviewed and accurate as of 04/14/25.    Anayeli De Leon PA-C  04/14/25

## 2025-04-18 ENCOUNTER — PATIENT ROUNDING (BHMG ONLY) (OUTPATIENT)
Dept: NEUROSURGERY | Facility: CLINIC | Age: 45
End: 2025-04-18
Payer: COMMERCIAL

## 2025-04-18 NOTE — PROGRESS NOTES
A MY-CHART MESSAGE HAS BEEN SENT TO THE PATIENT FOR PATIENT ROUNDING WITH [facility].

## 2025-05-02 RX ORDER — LOSARTAN POTASSIUM 25 MG/1
25 TABLET ORAL DAILY
Qty: 90 TABLET | Refills: 3 | Status: SHIPPED | OUTPATIENT
Start: 2025-05-02

## 2025-08-12 ENCOUNTER — OFFICE VISIT (OUTPATIENT)
Dept: NEUROLOGY | Facility: CLINIC | Age: 45
End: 2025-08-12
Payer: COMMERCIAL

## 2025-08-12 VITALS
OXYGEN SATURATION: 97 % | HEIGHT: 74 IN | HEART RATE: 83 BPM | DIASTOLIC BLOOD PRESSURE: 78 MMHG | SYSTOLIC BLOOD PRESSURE: 124 MMHG | WEIGHT: 244 LBS | BODY MASS INDEX: 31.32 KG/M2

## 2025-08-12 DIAGNOSIS — M05.742 RHEUMATOID ARTHRITIS INVOLVING BOTH HANDS WITH POSITIVE RHEUMATOID FACTOR: ICD-10-CM

## 2025-08-12 DIAGNOSIS — R25.1 TREMOR: ICD-10-CM

## 2025-08-12 DIAGNOSIS — R20.2 PARESTHESIAS: ICD-10-CM

## 2025-08-12 DIAGNOSIS — M62.89 MUSCLE STIFFNESS: Primary | ICD-10-CM

## 2025-08-12 DIAGNOSIS — M05.741 RHEUMATOID ARTHRITIS INVOLVING BOTH HANDS WITH POSITIVE RHEUMATOID FACTOR: ICD-10-CM

## 2025-08-25 ENCOUNTER — TRANSCRIBE ORDERS (OUTPATIENT)
Dept: ADMINISTRATIVE | Facility: HOSPITAL | Age: 45
End: 2025-08-25
Payer: MEDICARE

## 2025-08-25 DIAGNOSIS — M54.16 LUMBAR RADICULOPATHY: Primary | ICD-10-CM

## 2025-08-28 ENCOUNTER — HOSPITAL ENCOUNTER (OUTPATIENT)
Dept: MRI IMAGING | Facility: HOSPITAL | Age: 45
Discharge: HOME | End: 2025-08-28
Admitting: PHYSICIAN ASSISTANT
Payer: MEDICARE

## 2025-08-28 DIAGNOSIS — M54.16 LUMBAR RADICULOPATHY: ICD-10-CM

## 2025-08-28 PROCEDURE — 72148 MRI LUMBAR SPINE W/O DYE: CPT

## (undated) DEVICE — CATH DIAG EXPO M/ PK 5F FL4/FR4 PIG

## (undated) DEVICE — GW PERIPH GUIDERIGHT STD/EXCHNG/J/TIP SS 0.035IN 5X260CM

## (undated) DEVICE — MODEL AT P65, P/N 701554-001KIT CONTENTS: HAND CONTROLLER, 3-WAY HIGH-PRESSURE STOPCOCK WITH ROTATING END AND PREMIUM HIGH-PRESSURE TUBING: Brand: ANGIOTOUCH® KIT

## (undated) DEVICE — MODEL BT2000 P/N 700287-012KIT CONTENTS: MANIFOLD WITH SALINE AND CONTRAST PORTS, SALINE TUBING WITH SPIKE AND HAND SYRINGE, TRANSDUCER: Brand: BT2000 AUTOMATED MANIFOLD KIT

## (undated) DEVICE — GLIDESHEATH SLENDER STAINLESS STEEL KIT: Brand: GLIDESHEATH SLENDER

## (undated) DEVICE — DEV COMPR RADL PRELUDESYNCEZ 30ML 32CM

## (undated) DEVICE — CVR PROB ULTRASND/TRANSD W/GEL 7X11IN STRL

## (undated) DEVICE — ADULT, W/LG. BACK PAD, RADIOTRANSPARENT ELEMENT AND LEAD WIRE: Brand: DEFIBRILLATION ELECTRODES

## (undated) DEVICE — PK CATH CARD 10

## (undated) DEVICE — CATH DIAG EXPO .045 FL3.5 5F 100CM